# Patient Record
Sex: MALE
[De-identification: names, ages, dates, MRNs, and addresses within clinical notes are randomized per-mention and may not be internally consistent; named-entity substitution may affect disease eponyms.]

---

## 2024-03-08 ENCOUNTER — APPOINTMENT (OUTPATIENT)
Dept: ORTHOPEDIC SURGERY | Facility: CLINIC | Age: 29
End: 2024-03-08
Payer: COMMERCIAL

## 2024-03-08 DIAGNOSIS — I10 ESSENTIAL (PRIMARY) HYPERTENSION: ICD-10-CM

## 2024-03-08 DIAGNOSIS — Z00.00 ENCOUNTER FOR GENERAL ADULT MEDICAL EXAMINATION W/OUT ABNORMAL FINDINGS: ICD-10-CM

## 2024-03-08 DIAGNOSIS — S96.912A STRAIN OF UNSPECIFIED MUSCLE AND TENDON AT ANKLE AND FOOT LEVEL, LEFT FOOT, INITIAL ENCOUNTER: ICD-10-CM

## 2024-03-08 PROCEDURE — 73630 X-RAY EXAM OF FOOT: CPT | Mod: RT

## 2024-03-08 PROCEDURE — 99213 OFFICE O/P EST LOW 20 MIN: CPT

## 2024-03-08 RX ORDER — AMLODIPINE BESYLATE 5 MG/1
TABLET ORAL
Refills: 0 | Status: ACTIVE | COMMUNITY

## 2024-03-08 NOTE — PHYSICAL EXAM
[NL (40)] : plantar flexion 40 degrees [NL 30)] : inversion 30 degrees [5___] : eversion 5[unfilled]/5 [2+] : posterior tibialis pulse: 2+ [Normal] : saphenous nerve sensation normal [] : patient ambulates without assistive device [Left] : left foot [Weight -] : weightbearing [There are no fractures, subluxations or dislocations. No significant abnormalities are seen] : There are no fractures, subluxations or dislocations. No significant abnormalities are seen [de-identified] : eversion 15 degrees [[id number]] : dorsiflexion 15 degrees

## 2024-03-08 NOTE — HISTORY OF PRESENT ILLNESS
[4] : 4 [Throbbing] : throbbing [de-identified] : 03/08/2024 GARY DELVALLE a 28 year old male here for evaluation of his left foot. Denies trauma. Reports lateral foot pain since 3/4/24. No formal treatment to date. History of ankle sprains. WB in slippers.   [] : no [FreeTextEntry1] : left foot

## 2024-03-08 NOTE — DISCUSSION/SUMMARY
[de-identified] : WBAT in supportive footwear. Voltaren gel as directed prn. Ice to affected area.  Activity modification.  He will follow up prn.

## 2024-03-28 ENCOUNTER — APPOINTMENT (OUTPATIENT)
Dept: ORTHOPEDIC SURGERY | Facility: CLINIC | Age: 29
End: 2024-03-28

## 2025-06-09 ENCOUNTER — OFFICE (OUTPATIENT)
Dept: URBAN - METROPOLITAN AREA CLINIC 63 | Facility: CLINIC | Age: 30
Setting detail: OPHTHALMOLOGY
End: 2025-06-09
Payer: COMMERCIAL

## 2025-06-09 DIAGNOSIS — H02.841: ICD-10-CM

## 2025-06-09 DIAGNOSIS — H02.844: ICD-10-CM

## 2025-06-09 DIAGNOSIS — H00.011: ICD-10-CM

## 2025-06-09 DIAGNOSIS — H00.014: ICD-10-CM

## 2025-06-09 PROBLEM — H01.001 BLEPHARITIS; RIGHT UPPER LID, RIGHT LOWER LID, LEFT UPPER LID, LEFT LOWER LID: Status: ACTIVE | Noted: 2025-06-09

## 2025-06-09 PROBLEM — H01.005 BLEPHARITIS; RIGHT UPPER LID, RIGHT LOWER LID, LEFT UPPER LID, LEFT LOWER LID: Status: ACTIVE | Noted: 2025-06-09

## 2025-06-09 PROBLEM — H01.002 BLEPHARITIS; RIGHT UPPER LID, RIGHT LOWER LID, LEFT UPPER LID, LEFT LOWER LID: Status: ACTIVE | Noted: 2025-06-09

## 2025-06-09 PROBLEM — H01.004 BLEPHARITIS; RIGHT UPPER LID, RIGHT LOWER LID, LEFT UPPER LID, LEFT LOWER LID: Status: ACTIVE | Noted: 2025-06-09

## 2025-06-09 PROCEDURE — 92002 INTRM OPH EXAM NEW PATIENT: CPT | Performed by: INTERNAL MEDICINE

## 2025-06-09 ASSESSMENT — KERATOMETRY
OD_K2POWER_DIOPTERS: 43.75
OS_K2POWER_DIOPTERS: 44.75
OD_AXISANGLE_DEGREES: 160
OD_K1POWER_DIOPTERS: 43.50
OS_AXISANGLE_DEGREES: 065
OS_K1POWER_DIOPTERS: 43.50

## 2025-06-09 ASSESSMENT — REFRACTION_CURRENTRX
OD_SPHERE: -4.50
OS_CYLINDER: -1.00
OS_SPHERE: -4.50
OD_AXIS: 180
OD_VPRISM_DIRECTION: SV
OD_OVR_VA: 20/
OS_AXIS: 163
OS_OVR_VA: 20/
OS_VPRISM_DIRECTION: SV
OD_CYLINDER: 0.00

## 2025-06-09 ASSESSMENT — LID EXAM ASSESSMENTS
OS_BLEPHARITIS: LLL LUL 1+
OD_BLEPHARITIS: RLL RUL 1+
OD_EDEMA: RUL 2+
OS_EDEMA: LUL 2+

## 2025-06-09 ASSESSMENT — TONOMETRY
OS_IOP_MMHG: 18
OD_IOP_MMHG: 20

## 2025-06-09 ASSESSMENT — VISUAL ACUITY
OS_BCVA: 20/25+1
OD_BCVA: 20/30

## 2025-06-09 ASSESSMENT — CONFRONTATIONAL VISUAL FIELD TEST (CVF)
OD_FINDINGS: FULL
OS_FINDINGS: FULL

## 2025-06-09 ASSESSMENT — REFRACTION_AUTOREFRACTION
OS_SPHERE: -4.75
OD_SPHERE: -3.75
OS_AXIS: 160
OS_CYLINDER: -1.25
OD_CYLINDER: -1.25
OD_AXIS: 092